# Patient Record
Sex: MALE | Race: WHITE | NOT HISPANIC OR LATINO | Employment: FULL TIME | URBAN - NONMETROPOLITAN AREA
[De-identification: names, ages, dates, MRNs, and addresses within clinical notes are randomized per-mention and may not be internally consistent; named-entity substitution may affect disease eponyms.]

---

## 2020-12-01 ENCOUNTER — APPOINTMENT (OUTPATIENT)
Dept: GENERAL RADIOLOGY | Facility: HOSPITAL | Age: 50
End: 2020-12-01

## 2020-12-01 ENCOUNTER — HOSPITAL ENCOUNTER (EMERGENCY)
Facility: HOSPITAL | Age: 50
Discharge: HOME | End: 2020-12-01
Attending: EMERGENCY MEDICINE | Admitting: STUDENT IN AN ORGANIZED HEALTH CARE EDUCATION/TRAINING PROGRAM

## 2020-12-01 VITALS
SYSTOLIC BLOOD PRESSURE: 151 MMHG | HEIGHT: 68 IN | TEMPERATURE: 98.3 F | DIASTOLIC BLOOD PRESSURE: 106 MMHG | OXYGEN SATURATION: 93 % | HEART RATE: 82 BPM | RESPIRATION RATE: 18 BRPM | BODY MASS INDEX: 35.61 KG/M2 | WEIGHT: 235 LBS

## 2020-12-01 DIAGNOSIS — M79.642 LEFT HAND PAIN: ICD-10-CM
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
## 2020-12-01 DIAGNOSIS — R07.81 RIB PAIN: ICD-10-CM

## 2020-12-01 PROCEDURE — 99283 EMERGENCY DEPT VISIT LOW MDM: CPT

## 2020-12-01 PROCEDURE — 71046 X-RAY EXAM CHEST 2 VIEWS: CPT

## 2020-12-01 PROCEDURE — 73130 X-RAY EXAM OF HAND: CPT

## 2020-12-01 RX ORDER — PRAVASTATIN SODIUM 20 MG
20 TABLET ORAL DAILY
COMMUNITY

## 2020-12-01 RX ORDER — LISINOPRIL 20 MG/1
20 TABLET ORAL DAILY
COMMUNITY
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
## 2022-07-19 LAB
ALBUMIN SERPL-MCNC: 4.5 G/DL (ref 3.8–4.9)
ALBUMIN/GLOB SERPL: 1.7 {RATIO} (ref 1.2–2.2)
ALP SERPL-CCNC: 84 IU/L (ref 44–121)
ALT SERPL-CCNC: 19 IU/L (ref 0–44)
AMBIG ABBREV LP DEFAULT: NORMAL
AST SERPL-CCNC: 20 IU/L (ref 0–40)
BASOPHILS # BLD AUTO: 0.1 X10E3/UL (ref 0–0.2)
BASOPHILS NFR BLD AUTO: 1 %
BILIRUB SERPL-MCNC: 0.6 MG/DL (ref 0–1.2)
BUN SERPL-MCNC: 16 MG/DL (ref 6–24)
BUN/CREAT SERPL: 17 (ref 9–20)
CALCIUM SERPL-MCNC: 9.4 MG/DL (ref 8.7–10.2)
CHLORIDE SERPL-SCNC: 102 MMOL/L (ref 96–106)
CHOLEST SERPL-MCNC: 192 MG/DL (ref 100–199)
CO2 SERPL-SCNC: 21 MMOL/L (ref 20–29)
CREAT SERPL-MCNC: 0.92 MG/DL (ref 0.76–1.27)
EOSINOPHIL # BLD AUTO: 0.3 X10E3/UL (ref 0–0.4)
EOSINOPHIL NFR BLD AUTO: 5 %
ERYTHROCYTE [DISTWIDTH] IN BLOOD BY AUTOMATED COUNT: 13 % (ref 11.6–15.4)
GLOBULIN SER CALC-MCNC: 2.6 G/DL (ref 1.5–4.5)
GLUCOSE SERPL-MCNC: 90 MG/DL (ref 65–99)
HCT VFR BLD AUTO: 45.5 % (ref 37.5–51)
HDLC SERPL-MCNC: 43 MG/DL
HGB BLD-MCNC: 16 G/DL (ref 13–17.7)
IMM GRANULOCYTES # BLD AUTO: 0 X10E3/UL (ref 0–0.1)
IMM GRANULOCYTES NFR BLD AUTO: 0 %
LDLC SERPL CALC-MCNC: 126 MG/DL (ref 0–99)
LYMPHOCYTES # BLD AUTO: 1.6 X10E3/UL (ref 0.7–3.1)
LYMPHOCYTES NFR BLD AUTO: 25 %
MCH RBC QN AUTO: 30.9 PG (ref 26.6–33)
MCHC RBC AUTO-ENTMCNC: 35.2 G/DL (ref 31.5–35.7)
MCV RBC AUTO: 88 FL (ref 79–97)
MONOCYTES # BLD AUTO: 0.5 X10E3/UL (ref 0.1–0.9)
MONOCYTES NFR BLD AUTO: 7 %
NEUTROPHILS # BLD AUTO: 4.1 X10E3/UL (ref 1.4–7)
NEUTROPHILS NFR BLD AUTO: 62 %
PLATELET # BLD AUTO: 297 X10E3/UL (ref 150–450)
POTASSIUM SERPL-SCNC: 4.4 MMOL/L (ref 3.5–5.2)
PROT SERPL-MCNC: 7.1 G/DL (ref 6–8.5)
RBC # BLD AUTO: 5.18 X10E6/UL (ref 4.14–5.8)
SODIUM SERPL-SCNC: 137 MMOL/L (ref 134–144)
TRIGL SERPL-MCNC: 129 MG/DL (ref 0–149)
VLDLC SERPL CALC-MCNC: 23 MG/DL (ref 5–40)
WBC # BLD AUTO: 6.6 X10E3/UL (ref 3.4–10.8)

## 2023-04-04 ENCOUNTER — OFFICE VISIT (OUTPATIENT)
Dept: CARDIOLOGY | Facility: CLINIC | Age: 53
End: 2023-04-04
Payer: COMMERCIAL

## 2023-04-04 VITALS
OXYGEN SATURATION: 93 % | DIASTOLIC BLOOD PRESSURE: 72 MMHG | HEART RATE: 79 BPM | BODY MASS INDEX: 37.59 KG/M2 | SYSTOLIC BLOOD PRESSURE: 126 MMHG | WEIGHT: 248 LBS | HEIGHT: 68 IN

## 2023-04-04 DIAGNOSIS — I10 HYPERTENSION, UNSPECIFIED TYPE: ICD-10-CM

## 2023-04-04 DIAGNOSIS — G47.33 OBSTRUCTIVE SLEEP APNEA (ADULT) (PEDIATRIC): Primary | ICD-10-CM

## 2023-04-04 DIAGNOSIS — E78.5 HYPERLIPIDEMIA, UNSPECIFIED HYPERLIPIDEMIA TYPE: ICD-10-CM

## 2023-04-04 PROCEDURE — 99214 OFFICE O/P EST MOD 30 MIN: CPT | Performed by: NURSE PRACTITIONER

## 2023-04-04 NOTE — PROGRESS NOTES
Follow-Up Sleep Consult     Date:   2023  Name: Prakash Hinton  :   1970  PCP: Provider, No Known    Chief Complaint   Patient presents with   • Sleep Apnea   • Hypertension       Subjective     History of Present Illness  Prakash Hniton is a 52 y.o. male who presents today for follow-up on THIAGO. HTN and HLD. Patient is doing well on PAP therapy with good control and compliance. AHI on download is 0.7. He is benefiting from PAP therapy, denies excessive daytime sleepiness and napping. Airflow is good, nasal pillow mask is comfortable with no significant leaking. Blood pressure is well controlled on Lisinopril. He denies chest pain, shortness of breath, palpitations, dizziness, lower extremity edema or syncope. Overall, patient is doing very well.     History of THIAGO with a baseline AHI of 36 on PSG 17.      Current Treatment: DreamStation to auto CPAP 8-12cm   Device Download from 3/5/2023 to 4/3/2023 reviewed and interpreted.  AHI on download is 0.7  Compliance on download is 90%   When used >4 hours is 87%  Average use per night is 5 hours   Current mask used is nasal pillow    The patient's relevant past medical, surgical, family, and social history reviewed and updated in Epic as appropriate.    Past Medical History:   Diagnosis Date   • Gallstones    • Hyperlipidemia    • Hypertension    • Obstructive sleep apnea (adult) (pediatric)     AHI 36     Past Surgical History:   Procedure Laterality Date   • DENTAL PROCEDURE      ROOT CANAL   • GALLBLADDER SURGERY  2022   • VASECTOMY         No Known Allergies  Prior to Admission medications    Medication Sig Start Date End Date Taking? Authorizing Provider   lisinopril (PRINIVIL,ZESTRIL) 20 MG tablet Take 20 mg by mouth Daily.    ProviderDameon MD   pravastatin (PRAVACHOL) 20 MG tablet Take 20 mg by mouth Daily.    Dameon Mari MD     History reviewed. No pertinent family history.    Objective     Vital Signs:  /72 (BP  "Location: Right arm, Patient Position: Sitting)   Pulse 79   Ht 172.7 cm (68\")   Wt 112 kg (248 lb)   SpO2 93%   BMI 37.71 kg/m²     Class 2 Severe Obesity (BMI >=35 and <=39.9). Obesity-related health conditions include the following: hypertension and dyslipidemias. Obesity is unchanged. BMI is is above average; BMI management plan is completed. We discussed portion control and increasing exercise.        Physical Exam  Vitals reviewed.   Constitutional:       Appearance: Normal appearance.   HENT:      Head: Normocephalic.   Cardiovascular:      Rate and Rhythm: Normal rate and regular rhythm.      Heart sounds: Normal heart sounds.   Pulmonary:      Effort: Pulmonary effort is normal.      Breath sounds: Normal breath sounds.   Skin:     General: Skin is warm and dry.      Capillary Refill: Capillary refill takes less than 2 seconds.   Neurological:      General: No focal deficit present.      Mental Status: He is alert and oriented to person, place, and time.   Psychiatric:         Mood and Affect: Mood normal.         Behavior: Behavior normal.             PAP download reviewed: 3/5/2023 to 4/3/2023 reviewed and interpreted.         Assessment and Plan     Diagnoses and all orders for this visit:    1. Obstructive sleep apnea (adult) (pediatric) (Primary)  Assessment & Plan:  Doing well on PAP therapy with good control and compliance. AHI 0.7 on download. He is benefiting from PAP therapy and we will continue therapy at current settings.     Orders:  -     PAP Therapy    2. Hyperlipidemia, unspecified hyperlipidemia type  Assessment & Plan:  Reassess with fasting lipid panel.  -Continue Pravastatin 20mg daily.     Orders:  -     Lipid Panel; Future  -     Comprehensive Metabolic Panel; Future  -     CBC & Differential; Future    3. Hypertension, unspecified type  Assessment & Plan:  Hypertension is well controlled on Lisinopril. Blood pressure today- 126/72  -Continue Lisinopril 20mg daily.     Orders:  -  "    Comprehensive Metabolic Panel; Future  -     CBC & Differential; Future      Limit salt, Exercise recommendations discussed and Report if any new/changing symptoms immediately         Follow Up  Return in about 1 year (around 4/4/2024).  Patient was given instructions and counseling regarding his condition or for health maintenance advice. Please see specific information pulled into the AVS if appropriate.

## 2023-04-04 NOTE — ASSESSMENT & PLAN NOTE
Doing well on PAP therapy with good control and compliance. AHI 0.7 on download. He is benefiting from PAP therapy and we will continue therapy at current settings.

## 2023-04-04 NOTE — ASSESSMENT & PLAN NOTE
Hypertension is well controlled on Lisinopril. Blood pressure today- 126/72  -Continue Lisinopril 20mg daily.

## 2023-08-21 DIAGNOSIS — I10 HYPERTENSION, UNSPECIFIED TYPE: ICD-10-CM

## 2023-08-21 DIAGNOSIS — E78.5 HYPERLIPIDEMIA, UNSPECIFIED HYPERLIPIDEMIA TYPE: ICD-10-CM

## 2023-10-27 RX ORDER — PRAVASTATIN SODIUM 40 MG
40 TABLET ORAL DAILY
Qty: 90 TABLET | Refills: 1 | Status: SHIPPED | OUTPATIENT
Start: 2023-10-27

## 2023-10-27 RX ORDER — LISINOPRIL 20 MG/1
20 TABLET ORAL DAILY
Qty: 90 TABLET | Refills: 1 | Status: SHIPPED | OUTPATIENT
Start: 2023-10-27

## 2024-04-08 ENCOUNTER — OFFICE VISIT (OUTPATIENT)
Dept: CARDIOLOGY | Facility: CLINIC | Age: 54
End: 2024-04-08
Payer: COMMERCIAL

## 2024-04-08 VITALS
WEIGHT: 255 LBS | DIASTOLIC BLOOD PRESSURE: 70 MMHG | HEIGHT: 68 IN | OXYGEN SATURATION: 94 % | SYSTOLIC BLOOD PRESSURE: 132 MMHG | BODY MASS INDEX: 38.65 KG/M2 | HEART RATE: 86 BPM

## 2024-04-08 DIAGNOSIS — E78.00 PURE HYPERCHOLESTEROLEMIA: ICD-10-CM

## 2024-04-08 DIAGNOSIS — R94.31 ABNORMAL EKG: ICD-10-CM

## 2024-04-08 DIAGNOSIS — I51.7 LEFT VENTRICULAR HYPERTROPHY: ICD-10-CM

## 2024-04-08 DIAGNOSIS — I10 PRIMARY HYPERTENSION: ICD-10-CM

## 2024-04-08 DIAGNOSIS — Z12.5 SCREENING PSA (PROSTATE SPECIFIC ANTIGEN): ICD-10-CM

## 2024-04-08 DIAGNOSIS — G47.33 OBSTRUCTIVE SLEEP APNEA (ADULT) (PEDIATRIC): Primary | ICD-10-CM

## 2024-04-08 PROCEDURE — 99214 OFFICE O/P EST MOD 30 MIN: CPT | Performed by: NURSE PRACTITIONER

## 2024-04-08 PROCEDURE — 93000 ELECTROCARDIOGRAM COMPLETE: CPT | Performed by: NURSE PRACTITIONER

## 2024-04-08 RX ORDER — CETIRIZINE HYDROCHLORIDE 10 MG/1
10 TABLET ORAL DAILY
COMMUNITY

## 2024-04-08 RX ORDER — PRAVASTATIN SODIUM 40 MG
40 TABLET ORAL DAILY
Qty: 90 TABLET | Refills: 1 | Status: SHIPPED | OUTPATIENT
Start: 2024-04-08

## 2024-04-08 RX ORDER — LISINOPRIL 20 MG/1
20 TABLET ORAL DAILY
Qty: 90 TABLET | Refills: 1 | Status: SHIPPED | OUTPATIENT
Start: 2024-04-08

## 2024-04-08 NOTE — ASSESSMENT & PLAN NOTE
He is 53 years old.    He does not have a PCP.    He is in agreement for annual screening for prostate cancer.    Plan:    PSA screening

## 2024-04-08 NOTE — PROGRESS NOTES
[facility] Provider Note     Date:   2024  Name: Prakash Hinton  :   1970  PCP: Provider, No Known    Chief Complaint   Patient presents with    Sleep Apnea       Subjective     History of Present Illness  Prakash Hinton is a 53 y.o. male who presents today for annual follow-up for his known history of severe sleep apnea on CPAP therapy, hypertension on lisinopril and hyperlipidemia on rosuvastatin.    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  He averages about 7 miles per day and he must push and pull very heavy loads.  He reports that occasionally he will get winded with pushing or pulling these heavy loads.  He reports that he has bilateral calf cramps and sharp pains that are a constant ache at times.  He is related this pain to his increased dose of statin.  Last year his statin was increased from 20 to 40 mg.      He follows with a chiropractor who has been working on this pain and had suggested an over-the-counter co-Q10.  He reports he is taking over-the-counter co-Q10.  With a slight improvement.    He reports that he had a 1 pack/day x 30-year tobacco history.  He did quit smoking nearly 5 years ago.    He reports that he does walk, stand and drive long hours with his job.  He reports that he does not have the energy to exercise after work.  His wife Kimber has been encouraging him to play pickle ball but he has not done this due to his bilateral lower extremity pain.    History of THIAGO with a baseline AHI of 36 on PSG 17.      Current Treatment: DreamStation to auto CPAP 8-12cm       Coexisting; Hypertension and hyperlipidemia      Reports Denies   Chest Pain [] [x]   Shortness of Air [x] []   Palpitations [] [x]   Edema [] [x]   Dizziness [] [x]   Syncope [] [x]       Current mask used is nasal    Device Functioning Well: Yes-but noted that he has a DreamStation #2 and we discussed that there is a  "warning for overheating.  His original device is greater than 5 years old and he wished to have it replaced.  Mask Fit Comfortable: Yes  Air Flow Comfortable: Yes  DME Helpful for Supplies: Yes he has been using [email or web address] but discussed new device and he wishes to use a local DME.  Sleep is rested:   Yes        Device Download:               No Known Allergies    Current Outpatient Medications:     lisinopril (PRINIVIL,ZESTRIL) 20 MG tablet, Take 1 tablet by mouth Daily., Disp: 90 tablet, Rfl: 1    pravastatin (PRAVACHOL) 40 MG tablet, Take 1 tablet by mouth Daily., Disp: 90 tablet, Rfl: 1    cetirizine (zyrTEC) 10 MG tablet, Take 1 tablet by mouth Daily., Disp: , Rfl:     Past Medical History:   Diagnosis Date    Gallstones     Hyperlipidemia     Hypertension     Obstructive sleep apnea (adult) (pediatric)     AHI 36      Past Surgical History:   Procedure Laterality Date    DENTAL PROCEDURE      ROOT CANAL    GALLBLADDER SURGERY  12/26/2022    VASECTOMY       History reviewed. No pertinent family history.  Social History     Socioeconomic History    Marital status:     Number of children: 2   Tobacco Use    Smoking status: Former    Smokeless tobacco: Never   Vaping Use    Vaping status: Never Used   Substance and Sexual Activity    Alcohol use: Never    Drug use: Never       Objective     Vital Signs:  /70   Pulse 86   Ht 172.7 cm (68\")   Wt 116 kg (255 lb)   SpO2 94%   BMI 38.77 kg/m²   Estimated body mass index is 38.77 kg/m² as calculated from the following:    Height as of this encounter: 172.7 cm (68\").    Weight as of this encounter: 116 kg (255 lb).       Class 2 Severe Obesity (BMI >=35 and <=39.9). Obesity-related health conditions include the following: obstructive sleep apnea, hypertension, and dyslipidemias. Obesity is worsening. BMI is is above average; BMI management plan is completed. We discussed low calorie, low carb based diet program, portion control, and increasing " exercise.      Physical Exam  Constitutional:       Appearance: Normal appearance. He is well-developed. He is obese.   HENT:      Head: Normocephalic and atraumatic.      Nose: Nose normal.      Mouth/Throat:      Mouth: Mucous membranes are moist.   Eyes:      General: No scleral icterus.     Pupils: Pupils are equal, round, and reactive to light.   Neck:      Vascular: No carotid bruit.   Cardiovascular:      Rate and Rhythm: Normal rate and regular rhythm.      Pulses: Normal pulses.           Radial pulses are 2+ on the right side and 2+ on the left side.        Dorsalis pedis pulses are 2+ on the right side and 2+ on the left side.        Posterior tibial pulses are 2+ on the right side and 2+ on the left side.      Heart sounds: Normal heart sounds. No murmur heard.  Pulmonary:      Effort: Pulmonary effort is normal.      Breath sounds: Normal breath sounds. No wheezing or rhonchi.   Abdominal:      General: Bowel sounds are normal.   Musculoskeletal:      Cervical back: Neck supple.      Right lower leg: No edema.      Left lower leg: No edema.   Skin:     General: Skin is warm and dry.      Capillary Refill: Capillary refill takes less than 2 seconds.      Coloration: Skin is not cyanotic.      Nails: There is no clubbing.   Neurological:      Mental Status: He is alert and oriented to person, place, and time.      Motor: No weakness.      Gait: Gait normal.   Psychiatric:         Mood and Affect: Mood normal.         Behavior: Behavior normal. Behavior is cooperative.         Thought Content: Thought content normal.         Cognition and Memory: Memory normal.         The following data was reviewed by: MARIO Neff on 04/08/2024:    PAP download reviewed: 3/6/2024 through 4/4/2024.  30-day download.  I have reviewed and interpreted the data on the download.      ECG 12 Lead    Date/Time: 4/8/2024 3:34 PM  Performed by: Gabriela Toledo APRN    Authorized by: Gabriela Toledo APRN   Comparison: compared with previous ECG from 2/10/2017  Similar to previous ECG  Comparison to previous ECG: SR with IRBBB  Rhythm: sinus rhythm  Rate: normal  BPM: 82  Conduction: incomplete right bundle branch block and 1st degree AV block  Other findings: non-specific ST-T wave changes    Clinical impression: abnormal EKG  Comments: Cannot rule out, age undetermined Anterior infarct           Assessment and Plan     Diagnoses and all orders for this visit:    1. Obstructive sleep apnea (adult) (pediatric) (Primary)  Assessment & Plan:  Baseline AHI is 36.  This is severe sleep apnea.    He is on CPAP therapy.  Download reviewed with good control and good compliance.    He is benefiting from PAP therapy with plan to continue PAP therapy.    Noted that his PAP device is greater than 5 years old and he wishes to have this replaced.    Prescription to DME of patient's choice for a new auto CPAP 8 to 12 cm and CPAP supplies.    Follow-up on new CPAP when used about 2 months or for a 31 to 90-day visit for compliance and control and download review.    Orders:  -     PAP Therapy    2. Pure hypercholesterolemia  Assessment & Plan:   Since increasing his pravastatin to 40 mg he has had a problem with bilateral lower extremity muscle aches and sharp pains.    He did start a supplement of co-Q10 with slight improvement.    Most recent labs show LDL well-controlled at near 100 and a nondiabetic.    Plan:    He may have a trial of reducing his pravastatin to 20 mg daily.    Recheck annual fasting lipid profile    Orders:  -     Lipid Panel; Future  -     pravastatin (PRAVACHOL) 40 MG tablet; Take 1 tablet by mouth Daily.  Dispense: 90 tablet; Refill: 1    3. Primary hypertension  Assessment & Plan:  Blood pressure today 132/70.  This is well-controlled.    He is on lisinopril 20 mg daily.    Plan:    Continue lisinopril 20 mg daily    Check CBC and CMP    Orders:  -     CBC & Differential; Future  -     Comprehensive  Metabolic Panel; Future  -     lisinopril (PRINIVIL,ZESTRIL) 20 MG tablet; Take 1 tablet by mouth Daily.  Dispense: 90 tablet; Refill: 1  -     ECG 12 Lead  -     Adult Transthoracic Echo Complete W/ Cont if Necessary Per Protocol; Future  -     Adult Stress Echo W/ Cont or Stress Agent if Necessary Per Protocol; Future    4. Screening PSA (prostate specific antigen)  Assessment & Plan:  He is 53 years old.    He does not have a PCP.    He is in agreement for annual screening for prostate cancer.    Plan:    PSA screening    Orders:  -     PSA Screen; Future    5. Abnormal EKG  Assessment & Plan:  EKG today with sinus rhythm with first-degree AV block, incomplete right bundle branch block, moderate voltage criteria for LVH and anterior lateral infarct age undetermined.    Plan:    Check echo for voltage criteria for LVH    Plan restratification with stress testing for anterior lateral infarct age undetermined    Orders:  -     Adult Transthoracic Echo Complete W/ Cont if Necessary Per Protocol; Future  -     Adult Stress Echo W/ Cont or Stress Agent if Necessary Per Protocol; Future    6. Left ventricular hypertrophy  Assessment & Plan:  EKG with moderate voltage criteria for LVH.    Plan echo    Orders:  -     Adult Transthoracic Echo Complete W/ Cont if Necessary Per Protocol; Future        Recommendations: Report if any new/changing symptoms immediately          Follow Up  Return in about 3 months (around 6/27/2024) for New Cpap and Lab results in 31-90 days .  Patient was given instructions and counseling regarding his condition or for health maintenance advice. Please see specific information pulled into the AVS if appropriate.

## 2024-04-08 NOTE — ASSESSMENT & PLAN NOTE
Since increasing his pravastatin to 40 mg he has had a problem with bilateral lower extremity muscle aches and sharp pains.    He did start a supplement of co-Q10 with slight improvement.    Most recent labs show LDL well-controlled at near 100 and a nondiabetic.    Plan:    He may have a trial of reducing his pravastatin to 20 mg daily.    Recheck annual fasting lipid profile

## 2024-04-08 NOTE — ASSESSMENT & PLAN NOTE
EKG today with sinus rhythm with first-degree AV block, incomplete right bundle branch block, moderate voltage criteria for LVH and anterior lateral infarct age undetermined.    Plan:    Check echo for voltage criteria for LVH    Plan restratification with stress testing for anterior lateral infarct age undetermined

## 2024-04-08 NOTE — ASSESSMENT & PLAN NOTE
Baseline AHI is 36.  This is severe sleep apnea.    He is on CPAP therapy.  Download reviewed with good control and good compliance.    He is benefiting from PAP therapy with plan to continue PAP therapy.    Noted that his PAP device is greater than 5 years old and he wishes to have this replaced.    Prescription to DME of patient's choice for a new auto CPAP 8 to 12 cm and CPAP supplies.    Follow-up on new CPAP when used about 2 months or for a 31 to 90-day visit for compliance and control and download review.

## 2024-04-08 NOTE — ASSESSMENT & PLAN NOTE
Blood pressure today 132/70.  This is well-controlled.    He is on lisinopril 20 mg daily.    Plan:    Continue lisinopril 20 mg daily    Check CBC and CMP

## 2024-04-18 DIAGNOSIS — E78.00 PURE HYPERCHOLESTEROLEMIA: ICD-10-CM

## 2024-04-18 DIAGNOSIS — Z12.5 SCREENING PSA (PROSTATE SPECIFIC ANTIGEN): ICD-10-CM

## 2024-04-18 DIAGNOSIS — I10 PRIMARY HYPERTENSION: ICD-10-CM

## 2024-04-26 ENCOUNTER — OFFICE VISIT (OUTPATIENT)
Dept: CARDIOLOGY | Facility: CLINIC | Age: 54
End: 2024-04-26
Payer: COMMERCIAL

## 2024-04-26 VITALS
SYSTOLIC BLOOD PRESSURE: 136 MMHG | OXYGEN SATURATION: 94 % | WEIGHT: 253 LBS | HEIGHT: 68 IN | BODY MASS INDEX: 38.34 KG/M2 | DIASTOLIC BLOOD PRESSURE: 78 MMHG | HEART RATE: 78 BPM

## 2024-04-26 DIAGNOSIS — R94.31 ABNORMAL EKG: Primary | ICD-10-CM

## 2024-04-26 DIAGNOSIS — E78.00 PURE HYPERCHOLESTEROLEMIA: ICD-10-CM

## 2024-04-26 DIAGNOSIS — I10 PRIMARY HYPERTENSION: ICD-10-CM

## 2024-04-26 PROCEDURE — 99214 OFFICE O/P EST MOD 30 MIN: CPT | Performed by: INTERNAL MEDICINE

## 2024-04-26 NOTE — PROGRESS NOTES
[facility] Provider Note     Date:   2024   Name: Prakash Hinton  :   1970  PCP: Provider, No Known    Chief Complaint   Patient presents with    Follow-up     Patient here for stress/echo- target HR reached/ only symptom some shortness of breath       Subjective     History of Present Illness  Prakash Hinton is a 53 y.o. male who presents today for follow-up on some shortness of breath and cardiac risk factors.  He reports no chest pain.  He is doing well otherwise.  He has not had any lower extremity edema.  He has had some bilateral calf cramping.  It happens when he sits down in his truck and certain positions.  It also gets worse with walking.  He does not have any toe discoloration or ulcers on his legs.  He has great peripheral pulses.  He underwent stress testing today and the results were overall low risk.  He is overall feeling well.  We discussed his cholesterol and lab work as well.    History of THIAGO with a baseline AHI of 36 on PSG 17.      Current Treatment: [employer] to auto CPAP 8-12cm       Coexisting; Hypertension and hyperlipidemia     No Known Allergies    Current Outpatient Medications:     cetirizine (zyrTEC) 10 MG tablet, Take 1 tablet by mouth Daily., Disp: , Rfl:     lisinopril (PRINIVIL,ZESTRIL) 20 MG tablet, Take 1 tablet by mouth Daily., Disp: 90 tablet, Rfl: 1    pravastatin (PRAVACHOL) 40 MG tablet, Take 1 tablet by mouth Daily., Disp: 90 tablet, Rfl: 1    Past Medical History:   Diagnosis Date    Gallstones     Hyperlipidemia     Hypertension     Obstructive sleep apnea (adult) (pediatric)     AHI 36      Past Surgical History:   Procedure Laterality Date    DENTAL PROCEDURE      ROOT CANAL    GALLBLADDER SURGERY  2022    VASECTOMY       History reviewed. No pertinent family history.  Social History     Socioeconomic History    Marital status:     Number of children: 2   Tobacco Use    Smoking status: Former    Smokeless  "tobacco: Never   Vaping Use    Vaping status: Never Used   Substance and Sexual Activity    Alcohol use: Never    Drug use: Never       Objective     Vital Signs:  /78 (BP Location: Right arm, Patient Position: Sitting, Cuff Size: Adult)   Pulse 78   Ht 172.7 cm (68\")   Wt 115 kg (253 lb)   SpO2 94%   BMI 38.47 kg/m²   Estimated body mass index is 38.47 kg/m² as calculated from the following:    Height as of this encounter: 172.7 cm (68\").    Weight as of this encounter: 115 kg (253 lb).         Physical Exam  Vitals reviewed.   Constitutional:       General: He is not in acute distress.     Appearance: Normal appearance.   HENT:      Head: Normocephalic and atraumatic.      Mouth/Throat:      Mouth: Mucous membranes are moist.   Eyes:      Conjunctiva/sclera: Conjunctivae normal.   Neck:      Vascular: No carotid bruit.   Cardiovascular:      Rate and Rhythm: Normal rate and regular rhythm.      Pulses: Normal pulses.      Heart sounds: Normal heart sounds. No murmur heard.  Pulmonary:      Effort: Pulmonary effort is normal. No respiratory distress.      Breath sounds: Normal breath sounds. No wheezing or rhonchi.   Abdominal:      General: Abdomen is flat.      Palpations: Abdomen is soft.   Musculoskeletal:      Cervical back: Normal range of motion and neck supple.      Right lower leg: No edema.      Left lower leg: No edema.   Skin:     General: Skin is warm and dry.      Coloration: Skin is not jaundiced.   Neurological:      General: No focal deficit present.      Mental Status: He is alert and oriented to person, place, and time. Mental status is at baseline.      GCS: GCS eye subscore is 4. GCS verbal subscore is 5. GCS motor subscore is 6.      Cranial Nerves: No cranial nerve deficit.      Motor: No weakness.      Gait: Gait normal.   Psychiatric:         Mood and Affect: Mood and affect normal. Mood is not anxious.         Speech: Speech normal.         Behavior: Behavior normal. "                     Assessment and Plan     Diagnoses and all orders for this visit:    1. Abnormal EKG (Primary)  Comments:  Low risk stress test.    2. Primary hypertension  Comments:  Controlled.  Continue current medication.    3. Pure hypercholesterolemia  Comments:  Moderately elevated but having myalgias with statins.  Trying over the counter Krill oil.  Will need to recheck labs in 6 months.        Recommendations: Report if any new/changing symptoms immediately      Follow Up  Return in about 6 months (around 10/26/2024) for Recheck symptoms, Patient OK with NP visit.    Avril Blanco MD   04/26/2024     Please note that this explicitly excludes time spent on other separate billable services such as performing procedures or test interpretation, when applicable.    This note was created using dictation software which occasionally transcribes nonsensical phrases. Please contact the provider if any clarification is needed.

## 2024-06-11 ENCOUNTER — OFFICE VISIT (OUTPATIENT)
Dept: CARDIOLOGY | Facility: CLINIC | Age: 54
End: 2024-06-11
Payer: COMMERCIAL

## 2024-06-11 VITALS
HEART RATE: 86 BPM | WEIGHT: 249 LBS | BODY MASS INDEX: 37.74 KG/M2 | DIASTOLIC BLOOD PRESSURE: 74 MMHG | HEIGHT: 68 IN | OXYGEN SATURATION: 93 % | SYSTOLIC BLOOD PRESSURE: 126 MMHG

## 2024-06-11 DIAGNOSIS — I10 PRIMARY HYPERTENSION: ICD-10-CM

## 2024-06-11 DIAGNOSIS — G47.33 OBSTRUCTIVE SLEEP APNEA (ADULT) (PEDIATRIC): Primary | ICD-10-CM

## 2024-06-11 DIAGNOSIS — E78.00 PURE HYPERCHOLESTEROLEMIA: ICD-10-CM

## 2024-06-11 PROCEDURE — 99214 OFFICE O/P EST MOD 30 MIN: CPT | Performed by: NURSE PRACTITIONER

## 2024-06-11 RX ORDER — EZETIMIBE 10 MG/1
10 TABLET ORAL DAILY
Qty: 90 TABLET | Refills: 0 | Status: SHIPPED | OUTPATIENT
Start: 2024-06-11

## 2024-06-11 RX ORDER — PRAVASTATIN SODIUM 20 MG
20 TABLET ORAL DAILY
Qty: 90 TABLET | Refills: 1 | Status: SHIPPED | OUTPATIENT
Start: 2024-06-11

## 2024-06-11 NOTE — PROGRESS NOTES
[facility] Provider Note     Date:   2024  Name: Prakash Hinton  :   1970  PCP: Provider, No Known    Chief Complaint   Patient presents with    Sleep Apnea       Subjective     History of Present Illness  Prakash Hinton is a 53 y.o. male who presents today for 3-month follow-up THIAGO on new CPAP and after having a low risk stress test.    He reports today that he can perform his physical activity at work and he is not having shortness of air or chest pain.    He will rarely have bilateral ankle edema after eating Chinese food.    He reports that his legs have muscle aches and pains associated with his statin.  He reports if he does not take the statin he does not have leg pain.  Most recent labs indicate cholesterol is not well-controlled.    He wonders if there is other medications for his cholesterol level.    Sleep and Cardiac History :     1. THIAGO baseline AHI of 36 on PSG 17     -Current THIAGO therapy auto CPAP 8-12cm     2. ECHO 24 :  ·  Left ventricular systolic function is normal. Left ventricular ejection fraction appears to be 61 - 65%.  ·  Left ventricular wall thickness is consistent with borderline concentric hypertrophy.  ·  Left ventricular diastolic function was normal.  ·  Mild dilation of the sinuses of Valsalva is present. Mild dilation of the ascending aorta is present.     3. Stress ECHO 2024- Low Risk       Coexisting; Hypertension and hyperlipidemia      Reports Denies   Chest Pain [] [x]   Shortness of Air [] [x]   Palpitations [] [x]   Edema [] [x]   Dizziness [] [x]   Syncope [] [x]       Current mask used is Nasal pillow     Device Functioning Well: Yes-he has a new CPAP and he is satisfied with the function.  Mask Fit Comfortable: Yes  Air Flow Comfortable: Yes  DME Helpful for Supplies: Yes  Sleep is rested:   Yes        Device Download:                 No Known Allergies    Current Outpatient Medications:     cetirizine (zyrTEC) 10 MG  "tablet, Take 1 tablet by mouth Daily., Disp: , Rfl:     lisinopril (PRINIVIL,ZESTRIL) 20 MG tablet, Take 1 tablet by mouth Daily., Disp: 90 tablet, Rfl: 1    ezetimibe (ZETIA) 10 MG tablet, Take 1 tablet by mouth Daily., Disp: 90 tablet, Rfl: 0    pravastatin (Pravachol) 20 MG tablet, Take 1 tablet by mouth Daily., Disp: 90 tablet, Rfl: 1    Past Medical History:   Diagnosis Date    Gallstones     Hyperlipidemia     Hypertension     Obstructive sleep apnea (adult) (pediatric)     AHI 36      Past Surgical History:   Procedure Laterality Date    DENTAL PROCEDURE      ROOT CANAL    GALLBLADDER SURGERY  2022    VASECTOMY       History reviewed. No pertinent family history.  Social History     Socioeconomic History    Marital status:     Number of children: 2   Tobacco Use    Smoking status: Former     Current packs/day: 0.00     Types: Cigarettes     Quit date:      Years since quittin.4     Passive exposure: Past    Smokeless tobacco: Never   Vaping Use    Vaping status: Never Used   Substance and Sexual Activity    Alcohol use: Never    Drug use: Never       Objective     Vital Signs:  /74 (BP Location: Right arm, Patient Position: Sitting)   Pulse 86   Ht 172.7 cm (68\")   Wt 113 kg (249 lb)   SpO2 93%   BMI 37.86 kg/m²   Estimated body mass index is 37.86 kg/m² as calculated from the following:    Height as of this encounter: 172.7 cm (68\").    Weight as of this encounter: 113 kg (249 lb).               Physical Exam  HENT:      Head: Normocephalic.      Nose: Nose normal.      Mouth/Throat:      Mouth: Mucous membranes are moist.   Pulmonary:      Effort: Pulmonary effort is normal.   Skin:     General: Skin is warm and dry.   Neurological:      Mental Status: He is alert and oriented to person, place, and time.   Psychiatric:         Mood and Affect: Mood normal.         Behavior: Behavior normal.         Thought Content: Thought content normal.         The following data was " reviewed by: MARIO Neff on 06/11/2024:    PAP download reviewed: 30-day download as above.  I have reviewed and interpreted the data on the download at today's visit.          Assessment and Plan     Diagnoses and all orders for this visit:    1. Obstructive sleep apnea (adult) (pediatric) (Primary)  Assessment & Plan:  Baseline AHI is 36.  This is severe sleep apnea.    He has a new CPAP more than 1 month but less than 3 months.  He reports he is satisfied with the new device.    He is on CPAP therapy.  Download reviewed with good control and good compliance.    He is benefiting from PAP therapy with plan to continue PAP therapy.      2. Pure hypercholesterolemia  Assessment & Plan:   Since increasing his statin to 40 mg he has had problems with bilateral lower extremity muscle aches and sharp pains.    He did try adding a supplement of co-Q10 as well as Krill and neither of these have improved the sharp pains.    He is currently taking his statin every other day.    Last labs show .    Plan:    Decrease the pravastatin to 20 mg daily x 2 weeks and see if symptoms improve.    And Zetia 10 mg daily    Recheck labs in 3 months.        Orders:  -     pravastatin (Pravachol) 20 MG tablet; Take 1 tablet by mouth Daily.  Dispense: 90 tablet; Refill: 1  -     ezetimibe (ZETIA) 10 MG tablet; Take 1 tablet by mouth Daily.  Dispense: 90 tablet; Refill: 0  -     Lipid Panel; Future  -     Comprehensive Metabolic Panel; Future    3. Primary hypertension  Assessment & Plan:  Blood pressure 126/74.  Blood pressure is well-controlled.    Is currently on lisinopril 20 mg daily.    Plan:    Continue lisinopril 20 mg daily          Recommendations: Report if any new/changing symptoms immediately          Follow Up  Return in about 3 months (around 9/11/2024) for Lab results/CHol/BP .  Patient was given instructions and counseling regarding his condition or for health maintenance advice. Please see specific  information pulled into the AVS if appropriate.

## 2024-06-11 NOTE — ASSESSMENT & PLAN NOTE
Blood pressure 126/74.  Blood pressure is well-controlled.    Is currently on lisinopril 20 mg daily.    Plan:    Continue lisinopril 20 mg daily

## 2024-06-11 NOTE — ASSESSMENT & PLAN NOTE
Baseline AHI is 36.  This is severe sleep apnea.    He has a new CPAP more than 1 month but less than 3 months.  He reports he is satisfied with the new device.    He is on CPAP therapy.  Download reviewed with good control and good compliance.    He is benefiting from PAP therapy with plan to continue PAP therapy.

## 2024-06-11 NOTE — ASSESSMENT & PLAN NOTE
Since increasing his statin to 40 mg he has had problems with bilateral lower extremity muscle aches and sharp pains.    He did try adding a supplement of co-Q10 as well as Krill and neither of these have improved the sharp pains.    He is currently taking his statin every other day.    Last labs show .    Plan:    Decrease the pravastatin to 20 mg daily x 2 weeks and see if symptoms improve.    And Zetia 10 mg daily    Recheck labs in 3 months.

## 2024-09-09 DIAGNOSIS — E78.00 PURE HYPERCHOLESTEROLEMIA: ICD-10-CM

## 2024-09-09 RX ORDER — EZETIMIBE 10 MG/1
10 TABLET ORAL DAILY
Qty: 90 TABLET | Refills: 0 | Status: SHIPPED | OUTPATIENT
Start: 2024-09-09 | End: 2024-09-10 | Stop reason: SDUPTHER

## 2024-09-10 ENCOUNTER — OFFICE VISIT (OUTPATIENT)
Dept: CARDIOLOGY | Facility: CLINIC | Age: 54
End: 2024-09-10
Payer: COMMERCIAL

## 2024-09-10 VITALS
OXYGEN SATURATION: 93 % | BODY MASS INDEX: 35.61 KG/M2 | DIASTOLIC BLOOD PRESSURE: 86 MMHG | HEART RATE: 85 BPM | WEIGHT: 235 LBS | HEIGHT: 68 IN | SYSTOLIC BLOOD PRESSURE: 118 MMHG

## 2024-09-10 DIAGNOSIS — G47.33 OBSTRUCTIVE SLEEP APNEA (ADULT) (PEDIATRIC): ICD-10-CM

## 2024-09-10 DIAGNOSIS — I10 PRIMARY HYPERTENSION: ICD-10-CM

## 2024-09-10 DIAGNOSIS — E78.00 PURE HYPERCHOLESTEROLEMIA: ICD-10-CM

## 2024-09-10 DIAGNOSIS — E78.00 PURE HYPERCHOLESTEROLEMIA: Primary | ICD-10-CM

## 2024-09-10 PROCEDURE — 99214 OFFICE O/P EST MOD 30 MIN: CPT | Performed by: NURSE PRACTITIONER

## 2024-09-10 RX ORDER — EZETIMIBE 10 MG/1
10 TABLET ORAL DAILY
Qty: 90 TABLET | Refills: 1 | Status: SHIPPED | OUTPATIENT
Start: 2024-09-10

## 2024-09-10 RX ORDER — PRAVASTATIN SODIUM 20 MG
20 TABLET ORAL DAILY
Qty: 90 TABLET | Refills: 1 | Status: SHIPPED | OUTPATIENT
Start: 2024-09-10

## 2024-09-10 RX ORDER — LISINOPRIL 20 MG/1
20 TABLET ORAL DAILY
Qty: 90 TABLET | Refills: 1 | Status: SHIPPED | OUTPATIENT
Start: 2024-09-10

## 2024-09-10 NOTE — ASSESSMENT & PLAN NOTE
Baseline AHI is 36.  This is severe sleep apnea.    He is on CPAP therapy.  Download reviewed with good control and good compliance.    He is benefiting from PAP therapy.  We plan to continue PAP therapy.    He has a current DME order for his supplies.

## 2024-09-10 NOTE — PROGRESS NOTES
[facility] Provider Note     Date:   09/10/2024  Name: Prakash Hinton  :   1970  PCP: Provider, No Known    Chief Complaint   Patient presents with    Sleep Apnea       Subjective     History of Present Illness  Prakash Hinton is a 54 y.o. male who presents today for scheduled 3-month follow-up to repeat his labs.  He reports he did complete his labs prior to this visit.    He reports that he is taking both the statin and the Zetia.  He denies any leg pain, muscle aches that he associates with statin therapy.  He reports that he is a  and occasionally he will have muscle aches and strains due to his job.    Sleep and Cardiac History :     1. THIAGO baseline AHI of 36 on PSG 17     -Current THIAGO therapy auto CPAP 8-12cm     2. ECHO 24 :  ·  Left ventricular systolic function is normal. Left ventricular ejection fraction appears to be 61 - 65%.  ·  Left ventricular wall thickness is consistent with borderline concentric hypertrophy.  ·  Left ventricular diastolic function was normal.  ·  Mild dilation of the sinuses of Valsalva is present. Mild dilation of the ascending aorta is present.     3. Stress ECHO 2024- Low Risk       Coexisting; Hypertension and hyperlipidemia      Reports Denies   Chest Pain [] [x]   Shortness of Air [] [x]   Palpitations [] [x]   Edema [] [x]   Dizziness [] [x]   Syncope [] [x]     Current mask used is nasal cushion    Device Functioning Well: Yes  Mask Fit Comfortable: Yes  Air Flow Comfortable: Yes  DME Helpful for Supplies: Yes  Sleep is rested:   Yes    Device Download:               No Known Allergies    Current Outpatient Medications:     cetirizine (zyrTEC) 10 MG tablet, Take 1 tablet by mouth Daily., Disp: , Rfl:     ezetimibe (ZETIA) 10 MG tablet, Take 1 tablet by mouth Daily., Disp: 90 tablet, Rfl: 1    lisinopril (PRINIVIL,ZESTRIL) 20 MG tablet, Take 1 tablet by mouth Daily., Disp: 90 tablet, Rfl: 1    pravastatin  "(Pravachol) 20 MG tablet, Take 1 tablet by mouth Daily., Disp: 90 tablet, Rfl: 1    Past Medical History:   Diagnosis Date    Gallstones     Hyperlipidemia     Hypertension     Obstructive sleep apnea (adult) (pediatric)     AHI 36      Past Surgical History:   Procedure Laterality Date    DENTAL PROCEDURE      ROOT CANAL    GALLBLADDER SURGERY  2022    VASECTOMY       History reviewed. No pertinent family history.  Social History     Socioeconomic History    Marital status:     Number of children: 2   Tobacco Use    Smoking status: Former     Current packs/day: 0.00     Types: Cigarettes     Quit date:      Years since quittin.6     Passive exposure: Past    Smokeless tobacco: Never   Vaping Use    Vaping status: Never Used   Substance and Sexual Activity    Alcohol use: Never    Drug use: Never       Objective     Vital Signs:  /86   Pulse 85   Ht 172.7 cm (68\")   Wt 107 kg (235 lb)   SpO2 93%   BMI 35.73 kg/m²   Estimated body mass index is 35.73 kg/m² as calculated from the following:    Height as of this encounter: 172.7 cm (68\").    Weight as of this encounter: 107 kg (235 lb).               Physical Exam  HENT:      Head: Normocephalic.      Nose: Nose normal.      Mouth/Throat:      Mouth: Mucous membranes are moist.   Pulmonary:      Effort: Pulmonary effort is normal.   Skin:     General: Skin is warm and dry.   Neurological:      Mental Status: He is alert and oriented to person, place, and time.   Psychiatric:         Mood and Affect: Mood normal.         Behavior: Behavior normal.         Thought Content: Thought content normal.         The following data was reviewed by: MARIO Neff on 09/10/2024:    Labs reviewed: 2024 CMP and lipid and PAP download reviewed: 30-day download as above.  I have reviewed and interpreted the data on the download at today's visit          Assessment and Plan     Diagnoses and all orders for this visit:    1. Pure " hypercholesterolemia (Primary)  Assessment & Plan:   He is currently on over-the-counter supplement for CoQ 10 and krill oil.    He is taking prescriptive pravastatin 20 mg daily and Zetia 10 mg daily.    Noted with increased dose of pravastatin to 40 mg that he experienced bilateral lower extremity muscle aches and pains.    He has rechecked his lipid level and a CMP and these are both reviewed today.    Labs show cholesterol levels are much improved.  LDL at goal.  HDL is low.    Plan:    Continue pravastatin 20 mg daily and Zetia 10 mg daily    Encouraged omega-3 in his diet and exercise to increase HDL    Orders:  -     ezetimibe (ZETIA) 10 MG tablet; Take 1 tablet by mouth Daily.  Dispense: 90 tablet; Refill: 1  -     pravastatin (Pravachol) 20 MG tablet; Take 1 tablet by mouth Daily.  Dispense: 90 tablet; Refill: 1    2. Primary hypertension  Assessment & Plan:  Blood pressure today is 118/86.  This is well-controlled.    He is currently on lisinopril 20 mg daily.    Plan:    Continue lisinopril 20 mg daily    Orders:  -     lisinopril (PRINIVIL,ZESTRIL) 20 MG tablet; Take 1 tablet by mouth Daily.  Dispense: 90 tablet; Refill: 1    3. Obstructive sleep apnea (adult) (pediatric)  Assessment & Plan:  Baseline AHI is 36.  This is severe sleep apnea.    He is on CPAP therapy.  Download reviewed with good control and good compliance.    He is benefiting from PAP therapy.  We plan to continue PAP therapy.    He has a current DME order for his supplies.          Recommendations: Report if any new/changing symptoms immediately          Follow Up  Return in about 6 months (around 3/10/2025) for HLD/HTN/THIAGO.  Patient was given instructions and counseling regarding his condition or for health maintenance advice. Please see specific information pulled into the AVS if appropriate.

## 2024-09-10 NOTE — ASSESSMENT & PLAN NOTE
He is currently on over-the-counter supplement for CoQ 10 and krill oil.    He is taking prescriptive pravastatin 20 mg daily and Zetia 10 mg daily.    Noted with increased dose of pravastatin to 40 mg that he experienced bilateral lower extremity muscle aches and pains.    He has rechecked his lipid level and a CMP and these are both reviewed today.    Labs show cholesterol levels are much improved.  LDL at goal.  HDL is low.    Plan:    Continue pravastatin 20 mg daily and Zetia 10 mg daily    Encouraged omega-3 in his diet and exercise to increase HDL

## 2024-09-10 NOTE — ASSESSMENT & PLAN NOTE
Blood pressure today is 118/86.  This is well-controlled.    He is currently on lisinopril 20 mg daily.    Plan:    Continue lisinopril 20 mg daily

## 2024-10-30 ENCOUNTER — OFFICE VISIT (OUTPATIENT)
Dept: CARDIOLOGY | Facility: CLINIC | Age: 54
End: 2024-10-30
Payer: COMMERCIAL

## 2024-10-30 VITALS
SYSTOLIC BLOOD PRESSURE: 128 MMHG | DIASTOLIC BLOOD PRESSURE: 84 MMHG | BODY MASS INDEX: 35.61 KG/M2 | HEIGHT: 68 IN | HEART RATE: 73 BPM | WEIGHT: 235 LBS | OXYGEN SATURATION: 95 %

## 2024-10-30 DIAGNOSIS — I77.810 DILATED AORTIC ROOT: ICD-10-CM

## 2024-10-30 DIAGNOSIS — R91.1 NODULE OF MIDDLE LOBE OF RIGHT LUNG: Primary | ICD-10-CM

## 2024-10-30 DIAGNOSIS — I10 PRIMARY HYPERTENSION: ICD-10-CM

## 2024-10-30 DIAGNOSIS — E78.00 PURE HYPERCHOLESTEROLEMIA: ICD-10-CM

## 2024-10-30 PROCEDURE — 99214 OFFICE O/P EST MOD 30 MIN: CPT | Performed by: NURSE PRACTITIONER

## 2024-10-30 NOTE — ASSESSMENT & PLAN NOTE
3mm nodule in the right middle lobe seen on CT 2022. He has not had any follow up CT of the chest.     No cough, no unexplained weight loss.     He has tobacco history of  33 years with 1 ppd. He quiet 2017.     Plan:     CT of the chest with contrast for follow up.     He wishes for me to call his with results     He has scheduled follow up in about 5 months

## 2024-10-30 NOTE — ASSESSMENT & PLAN NOTE
Seen on echo:  -Dilated ascending aorta  -Dilated sinus of Valsalva    Plan:    CT of the chest for further evaluation to rule out aneurysm

## 2024-10-30 NOTE — PROGRESS NOTES
[facility] Provider Note     Date:   10/30/2024  Name: Prakash Hinton  :   1970  PCP: Provider, No Known    Chief Complaint   Patient presents with    Hyperlipidemia       Subjective     History of Present Illness  Prakash Hinton is a 54 y.o. male who presents today for short follow-up on his diagnosis of hyperlipidemia and having complaints of right lower extremity leg pain on statin therapy.    He reports today that he does have right calf and right knee pain from time to time with the statin.  Overall he feels like he can tolerate statin therapy and plans to continue his medications.    At today's visit we reviewed his last echo showing mild dilation of the sinuses of Valsalva and mild dilation of the ascending aorta.  We discussed if he ever had a CT scan or chest x-ray and he reported that he had when he had gallbladder surgery in 2022.  We reviewed a chest x-ray from 2022 at [facility] that showed ectasia of the aorta.  Then we reviewed a CT of the abdomen and pelvis from 2022 that showed a 3 mm nodule in his right middle lobe.  We discussed his history of tobacco.  He has a 1 pack/day time 33 years smoking history.  He did quit in 2017 or 2018.    He does not remember having any follow-up CT of the chest for further evaluation of the aortic ectasia or the 3 mm lung nodule.    He denies any shortness of air.  He is able to perform exertional activities at work as he lifts, pushes and bends frequently with heavy loads.  He denies any unexplained weight loss.  He denies any persistent chronic cough or coughing up blood.    Sleep and Cardiac History :     1. THIAGO baseline AHI of 36 on PSG 17     -Current THIAGO therapy auto CPAP 8-12cm     2. ECHO 24 :  ·  Left ventricular systolic function is normal. Left ventricular ejection fraction appears to be 61 - 65%.  ·  Left ventricular wall thickness is consistent with borderline concentric hypertrophy.  ·   "Left ventricular diastolic function was normal.  ·  Mild dilation of the sinuses of Valsalva is present. Mild dilation of the ascending aorta is present.     3. Stress ECHO 2024- Low Risk     4.  A CT of abdomen and pelvis  showed a 3 mm right middle lobe lung nodule      Coexisting; Hypertension and hyperlipidemia      Reports Denies   Chest Pain [] [x]   Shortness of Air [] [x]   Palpitations [] [x]   Edema [] [x]   Dizziness [] [x]   Syncope [] [x]       Current mask used is nasal cushion    Device Functioning Well: Yes  Mask Fit Comfortable: Yes  Air Flow Comfortable: Yes  DME Helpful for Supplies: Yes  Sleep is rested:   Yes    Device Download:               No Known Allergies    Current Outpatient Medications:     cetirizine (zyrTEC) 10 MG tablet, Take 1 tablet by mouth Daily., Disp: , Rfl:     ezetimibe (ZETIA) 10 MG tablet, Take 1 tablet by mouth Daily., Disp: 90 tablet, Rfl: 1    lisinopril (PRINIVIL,ZESTRIL) 20 MG tablet, Take 1 tablet by mouth Daily., Disp: 90 tablet, Rfl: 1    pravastatin (Pravachol) 20 MG tablet, Take 1 tablet by mouth Daily., Disp: 90 tablet, Rfl: 1    Past Medical History:   Diagnosis Date    Gallstones     Hyperlipidemia     Hypertension     Obstructive sleep apnea (adult) (pediatric)     AHI 36      Past Surgical History:   Procedure Laterality Date    DENTAL PROCEDURE      ROOT CANAL    GALLBLADDER SURGERY  2022    VASECTOMY       History reviewed. No pertinent family history.  Social History     Socioeconomic History    Marital status:     Number of children: 2   Tobacco Use    Smoking status: Former     Current packs/day: 0.00     Types: Cigarettes     Quit date:      Years since quittin.8     Passive exposure: Past    Smokeless tobacco: Never   Vaping Use    Vaping status: Never Used   Substance and Sexual Activity    Alcohol use: Never    Drug use: Never       Objective     Vital Signs:  /84   Pulse 73   Ht 172.7 cm (68\")   Wt 107 kg (235 " "lb)   SpO2 95%   BMI 35.73 kg/m²   Estimated body mass index is 35.73 kg/m² as calculated from the following:    Height as of this encounter: 172.7 cm (68\").    Weight as of this encounter: 107 kg (235 lb).               Physical Exam  Constitutional:       Appearance: Normal appearance. He is well-developed.   HENT:      Head: Normocephalic and atraumatic.      Nose: Nose normal.      Mouth/Throat:      Mouth: Mucous membranes are moist.   Eyes:      General: No scleral icterus.     Pupils: Pupils are equal, round, and reactive to light.   Neck:      Vascular: No carotid bruit.   Cardiovascular:      Rate and Rhythm: Normal rate and regular rhythm.      Pulses: Normal pulses.           Radial pulses are 2+ on the right side and 2+ on the left side.        Dorsalis pedis pulses are 2+ on the right side and 2+ on the left side.        Posterior tibial pulses are 2+ on the right side and 2+ on the left side.      Heart sounds: Normal heart sounds. No murmur heard.  Pulmonary:      Effort: Pulmonary effort is normal.      Breath sounds: Normal breath sounds. No wheezing or rhonchi.   Abdominal:      General: Bowel sounds are normal.   Musculoskeletal:      Right lower leg: No edema.      Left lower leg: No edema.   Skin:     General: Skin is warm and dry.      Capillary Refill: Capillary refill takes less than 2 seconds.      Coloration: Skin is not cyanotic.      Nails: There is no clubbing.   Neurological:      Mental Status: He is alert and oriented to person, place, and time.      Motor: No weakness.      Gait: Gait normal.   Psychiatric:         Mood and Affect: Mood normal.         Behavior: Behavior normal. Behavior is cooperative.         Thought Content: Thought content normal.         Cognition and Memory: Memory normal.         The following data was reviewed by: MARIO Neff on 10/30/2024:    Chest x-ray and CT of the abdomen pelvis from December 2022.          Assessment and Plan "     Diagnoses and all orders for this visit:    1. Nodule of middle lobe of right lung (Primary)  Assessment & Plan:    3mm nodule in the right middle lobe seen on CT 2022. He has not had any follow up CT of the chest.     No cough, no unexplained weight loss.     He has tobacco history of  33 years with 1 ppd. He quiet 2017.     Plan:     CT of the chest with contrast for follow up.     He wishes for me to call his with results     He has scheduled follow up in about 5 months     Orders:  -     CT Chest With Contrast Diagnostic; Future    2. Dilated aortic root  Assessment & Plan:  Seen on echo:  -Dilated ascending aorta  -Dilated sinus of Valsalva    Plan:    CT of the chest for further evaluation to rule out aneurysm    Orders:  -     CT Chest With Contrast Diagnostic; Future    3. Primary hypertension  Assessment & Plan:  Blood pressure today 128/84.  This is well-controlled.    He is currently on lisinopril 20 mg daily.    Plan:    Continue lisinopril 20 mg daily    Encouraged continued weight loss as his weight is down nearly 50 pounds    Encouraged low-sodium diet      4. Pure hypercholesterolemia  Assessment & Plan:   He is currently taking:  -Pravastatin 20 mg daily  -Zetia 10 mg daily  -Over-the-counter co-Q10  -Over-the-counter Krill    He reports that at times he will have some mild cramping, aching in the muscles of his right lower extremity and right knee.    He reports he does suspect pain may be a side effect of the statin but at lower dose this is tolerable.    Plan:    Continue current medications          Recommendations: Report if any new/changing symptoms immediately          Follow Up  No follow-ups on file.  Patient was given instructions and counseling regarding his condition or for health maintenance advice. Please see specific information pulled into the AVS if appropriate.

## 2024-10-30 NOTE — ASSESSMENT & PLAN NOTE
Blood pressure today 128/84.  This is well-controlled.    He is currently on lisinopril 20 mg daily.    Plan:    Continue lisinopril 20 mg daily    Encouraged continued weight loss as his weight is down nearly 50 pounds    Encouraged low-sodium diet

## 2024-10-30 NOTE — ASSESSMENT & PLAN NOTE
He is currently taking:  -Pravastatin 20 mg daily  -Zetia 10 mg daily  -Over-the-counter co-Q10  -Over-the-counter Krill    He reports that at times he will have some mild cramping, aching in the muscles of his right lower extremity and right knee.    He reports he does suspect pain may be a side effect of the statin but at lower dose this is tolerable.    Plan:    Continue current medications

## 2024-10-31 ENCOUNTER — PATIENT ROUNDING (BHMG ONLY) (OUTPATIENT)
Dept: CARDIOLOGY | Facility: CLINIC | Age: 54
End: 2024-10-31
Payer: COMMERCIAL

## 2024-10-31 NOTE — PROGRESS NOTES
..My name is Bijal Hernandes and I am the Practice Manager for [facility].    I would like to thank you for being a loyal patient. If you do not mind I would like to ask you a few questions about your recent visit with us.  Please feel free to reply if you wish to provide us with feedback on your first visit with our practice.    First, could you tell me what went well with your recent visit?    Secondly, we are always looking for ways to make our patients' experiences even better.  Do you have any recommendations on ways we may improve?    Finally, overall were you satisfied with your first visit to us as a [facility] facility?    In the next few days, you will be receiving a Patient Experience Survey.      Thank you for taking the time to answer a few questions today.  I hope you have a good day.

## 2024-12-02 ENCOUNTER — TELEPHONE (OUTPATIENT)
Dept: CARDIOLOGY | Facility: CLINIC | Age: 54
End: 2024-12-02
Payer: COMMERCIAL

## 2024-12-08 DIAGNOSIS — E78.00 PURE HYPERCHOLESTEROLEMIA: ICD-10-CM

## 2024-12-09 RX ORDER — PRAVASTATIN SODIUM 20 MG
20 TABLET ORAL DAILY
Qty: 90 TABLET | Refills: 1 | Status: SHIPPED | OUTPATIENT
Start: 2024-12-09

## 2024-12-14 DIAGNOSIS — E78.00 PURE HYPERCHOLESTEROLEMIA: ICD-10-CM

## 2024-12-16 ENCOUNTER — TELEPHONE (OUTPATIENT)
Dept: CARDIOLOGY | Facility: CLINIC | Age: 54
End: 2024-12-16
Payer: COMMERCIAL

## 2024-12-16 DIAGNOSIS — I77.810 DILATED AORTIC ROOT: ICD-10-CM

## 2024-12-16 DIAGNOSIS — R91.1 NODULE OF MIDDLE LOBE OF RIGHT LUNG: ICD-10-CM

## 2024-12-16 RX ORDER — EZETIMIBE 10 MG/1
10 TABLET ORAL DAILY
Qty: 90 TABLET | Refills: 1 | Status: SHIPPED | OUTPATIENT
Start: 2024-12-16

## 2024-12-16 NOTE — TELEPHONE ENCOUNTER
----- Message from Gabriela Toledo sent at 12/16/2024 11:50 AM EST -----  Call patient with results of CT of the chest.  Tell him this looks good.  No suspicious lung nodules or lung masses.  He has a follow-up appointment I think with me in March.  Encourage him to keep that appointment.

## 2025-03-10 ENCOUNTER — OFFICE VISIT (OUTPATIENT)
Dept: CARDIOLOGY | Facility: CLINIC | Age: 55
End: 2025-03-10
Payer: COMMERCIAL

## 2025-03-10 VITALS
DIASTOLIC BLOOD PRESSURE: 80 MMHG | OXYGEN SATURATION: 98 % | WEIGHT: 221 LBS | BODY MASS INDEX: 33.49 KG/M2 | HEART RATE: 70 BPM | SYSTOLIC BLOOD PRESSURE: 120 MMHG | HEIGHT: 68 IN

## 2025-03-10 DIAGNOSIS — I10 PRIMARY HYPERTENSION: Primary | ICD-10-CM

## 2025-03-10 DIAGNOSIS — G47.33 OBSTRUCTIVE SLEEP APNEA (ADULT) (PEDIATRIC): ICD-10-CM

## 2025-03-10 DIAGNOSIS — E78.00 PURE HYPERCHOLESTEROLEMIA: ICD-10-CM

## 2025-03-10 DIAGNOSIS — Z12.5 SCREENING PSA (PROSTATE SPECIFIC ANTIGEN): ICD-10-CM

## 2025-03-10 PROBLEM — R91.1 NODULE OF MIDDLE LOBE OF RIGHT LUNG: Status: RESOLVED | Noted: 2024-10-30 | Resolved: 2025-03-10

## 2025-03-10 PROBLEM — I77.810 DILATED AORTIC ROOT: Status: RESOLVED | Noted: 2024-10-30 | Resolved: 2025-03-10

## 2025-03-10 PROCEDURE — 99214 OFFICE O/P EST MOD 30 MIN: CPT | Performed by: NURSE PRACTITIONER

## 2025-03-10 RX ORDER — PRAVASTATIN SODIUM 20 MG
20 TABLET ORAL DAILY
Qty: 90 TABLET | Refills: 1 | Status: SHIPPED | OUTPATIENT
Start: 2025-03-10

## 2025-03-10 RX ORDER — LISINOPRIL 20 MG/1
20 TABLET ORAL DAILY
Qty: 90 TABLET | Refills: 1 | Status: SHIPPED | OUTPATIENT
Start: 2025-03-10

## 2025-03-10 RX ORDER — EZETIMIBE 10 MG/1
10 TABLET ORAL DAILY
Qty: 90 TABLET | Refills: 1 | Status: SHIPPED | OUTPATIENT
Start: 2025-03-10

## 2025-03-10 NOTE — ASSESSMENT & PLAN NOTE
Known history of severe sleep apnea.  Baseline AHI is 36.    He is on CPAP therapy.    Download is reviewed with good control and good compliance.    He is benefiting from PAP therapy.    Plan to continue PAP therapy.    Prescription for PAP supplies to the DME of his choice

## 2025-03-10 NOTE — PROGRESS NOTES
[facility] Provider Note     Date:   03/10/2025  Name: Prakash Hinton  :   1970  PCP: Provider, No Known    Chief Complaint   Patient presents with    Sleep Apnea       Subjective     History of Present Illness  Prakash Hinton is a 54 y.o. male who presents today for scheduled 4-month follow-up for his known history of lung nodule, sleep apnea, hypertension and hyperlipidemia.    He has completed a repeat CT of the chest for further evaluation and follow-up of the lung nodule seen on his CT of the abdomen and pelvis in .  Discussed findings on CT that showed no acute intrathoracic abnormality.  No suspicious pulmonary nodules.  And a stable right adrenal nodule since .  This was thought to be an adenoma.  Also seen hypodense lesion in the left lobe of the liver that is favored to be a cyst.    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  He denies any chest pain or shortness of air.    He reports that he continues to eat a healthy diet and exercise by walking with his wife.  He reports that her weight is down about 50 pounds and his weight is down about 30 pounds this year.  They have been working on a healthier lifestyle over the last few years.    Sleep and Cardiac History and testin. THIAGO baseline AHI of 36 on PSG 17     -Current THIAGO therapy auto CPAP 8-12cm     2. ECHO 24 :  ·  Left ventricular systolic function is normal. Left ventricular ejection fraction appears to be 61 - 65%.  ·  Left ventricular wall thickness is consistent with borderline concentric hypertrophy.  ·  Left ventricular diastolic function was normal.  ·  Mild dilation of the sinuses of Valsalva is present. Mild dilation of the ascending aorta is present.     3. Stress ECHO 2024- Low Risk     4.  A CT of abdomen and pelvis  showed a 3 mm right middle lobe lung nodule  -CT of the chest 2024  "showing:  No acute intrathoracic abnormality   No suspicious pulmonary nodule, mass   Stable right adrenal nodule since .  Likely adenoma.  Liver left lobe cyst    Coexisting; Hypertension and hyperlipidemia      Reports Denies   Chest Pain [] [x]   Shortness of Air [] [x]   Palpitations [] [x]   Edema [] [x]   Dizziness [] [x]   Syncope [] [x]       Current mask used is Nasal cushion     Device Functioning Well: Yes  Mask Fit Comfortable: Yes  Air Flow Comfortable: Yes  DME Helpful for Supplies: Yes  Sleep is rested:   Yes    Device Download:               No Known Allergies    Current Outpatient Medications:     cetirizine (zyrTEC) 10 MG tablet, Take 1 tablet by mouth Daily., Disp: , Rfl:     ezetimibe (ZETIA) 10 MG tablet, TAKE 1 TABLET BY MOUTH DAILY, Disp: 90 tablet, Rfl: 1    lisinopril (PRINIVIL,ZESTRIL) 20 MG tablet, Take 1 tablet by mouth Daily., Disp: 90 tablet, Rfl: 1    pravastatin (PRAVACHOL) 20 MG tablet, TAKE 1 TABLET BY MOUTH DAILY, Disp: 90 tablet, Rfl: 1    Past Medical History:   Diagnosis Date    Gallstones     Hyperlipidemia     Hypertension     Obstructive sleep apnea (adult) (pediatric)     AHI 36      Past Surgical History:   Procedure Laterality Date    DENTAL PROCEDURE      ROOT CANAL    GALLBLADDER SURGERY  2022    VASECTOMY       History reviewed. No pertinent family history.  Social History     Socioeconomic History    Marital status:     Number of children: 2   Tobacco Use    Smoking status: Former     Current packs/day: 0.00     Types: Cigarettes     Quit date:      Years since quittin.1     Passive exposure: Past    Smokeless tobacco: Never   Vaping Use    Vaping status: Never Used   Substance and Sexual Activity    Alcohol use: Never    Drug use: Never       Objective     Vital Signs:  /80 (BP Location: Right arm, Patient Position: Sitting, Cuff Size: Adult)   Pulse 70   Ht 172.7 cm (68\")   Wt 100 kg (221 lb)   SpO2 98%   BMI 33.60 kg/m² " "  Estimated body mass index is 33.6 kg/m² as calculated from the following:    Height as of this encounter: 172.7 cm (68\").    Weight as of this encounter: 100 kg (221 lb).               Physical Exam  Constitutional:       Appearance: Normal appearance. He is well-developed.   HENT:      Head: Normocephalic and atraumatic.      Nose: Nose normal.      Mouth/Throat:      Mouth: Mucous membranes are moist.   Eyes:      General: No scleral icterus.     Pupils: Pupils are equal, round, and reactive to light.   Neck:      Vascular: No carotid bruit.   Cardiovascular:      Rate and Rhythm: Normal rate and regular rhythm.      Pulses: Normal pulses.           Radial pulses are 2+ on the right side and 2+ on the left side.        Dorsalis pedis pulses are 2+ on the right side and 2+ on the left side.        Posterior tibial pulses are 2+ on the right side and 2+ on the left side.      Heart sounds: Normal heart sounds. No murmur heard.  Pulmonary:      Effort: Pulmonary effort is normal.      Breath sounds: Normal breath sounds. No wheezing or rhonchi.   Abdominal:      General: Bowel sounds are normal.   Musculoskeletal:      Right lower leg: No edema.      Left lower leg: No edema.   Skin:     General: Skin is warm and dry.      Capillary Refill: Capillary refill takes less than 2 seconds.      Coloration: Skin is not cyanotic.      Nails: There is no clubbing.   Neurological:      Mental Status: He is alert and oriented to person, place, and time.      Motor: No weakness.      Gait: Gait normal.   Psychiatric:         Mood and Affect: Mood normal.         Behavior: Behavior normal. Behavior is cooperative.         Thought Content: Thought content normal.         Cognition and Memory: Memory normal.         The following data was reviewed by: MARIO Neff on 03/10/2025:    PAP download reviewed: 30-day download as above.  I have reviewed and interpreted the data on the download at today's visit.  And " radiology study 12/13/2024 CT of the chest.          Assessment and Plan     Diagnoses and all orders for this visit:    1. Primary hypertension (Primary)  Assessment & Plan:  Blood pressure today is well-controlled.  Blood pressure 120/80.    He is currently on lisinopril 20 mg daily.    Plan:    Continue lisinopril 20 mg daily    Encouraged to continue weight loss as he is down 30 pounds this last year    Encouraged low-sodium diet    Orders:  -     Comprehensive Metabolic Panel; Future  -     CBC & Differential; Future    2. Obstructive sleep apnea (adult) (pediatric)  Assessment & Plan:  Known history of severe sleep apnea.  Baseline AHI is 36.    He is on CPAP therapy.    Download is reviewed with good control and good compliance.    He is benefiting from PAP therapy.    Plan to continue PAP therapy.    Prescription for PAP supplies to the DME of his choice    Orders:  -     PAP Therapy    3. Pure hypercholesterolemia  Assessment & Plan:   He has a known history of hyperlipidemia.    He is on pravastatin 20 mg and Zetia 10 mg daily.    In addition he also takes over-the-counter co-Q10 and krill oil.    He reports that he does have right knee pain and some very mild cramping and aching in the muscles of his right lower extremity.  But he suspects he has a right hip or knee problem and may need to see orthopedics.    Plan:    Check fasting lipids    Continue current medication regimen        Orders:  -     Lipid Panel; Future  -     Comprehensive Metabolic Panel; Future    4. Screening PSA (prostate specific antigen)  -     PSA Screen; Future        Recommendations: Report if any new/changing symptoms immediately          Follow Up  Return in about 6 months (around 9/10/2025) for THIAGO/HTN/HLD/EKG.  Patient was given instructions and counseling regarding his condition or for health maintenance advice. Please see specific information pulled into the AVS if appropriate.

## 2025-03-10 NOTE — ASSESSMENT & PLAN NOTE
Blood pressure today is well-controlled.  Blood pressure 120/80.    He is currently on lisinopril 20 mg daily.    Plan:    Continue lisinopril 20 mg daily    Encouraged to continue weight loss as he is down 30 pounds this last year    Encouraged low-sodium diet

## 2025-03-10 NOTE — ASSESSMENT & PLAN NOTE
He has a known history of hyperlipidemia.    He is on pravastatin 20 mg and Zetia 10 mg daily.    In addition he also takes over-the-counter co-Q10 and krill oil.    He reports that he does have right knee pain and some very mild cramping and aching in the muscles of his right lower extremity.  But he suspects he has a right hip or knee problem and may need to see orthopedics.    Plan:    Check fasting lipids    Continue current medication regimen

## 2025-03-11 ENCOUNTER — PATIENT ROUNDING (BHMG ONLY) (OUTPATIENT)
Dept: CARDIOLOGY | Facility: CLINIC | Age: 55
End: 2025-03-11
Payer: COMMERCIAL

## 2025-03-11 NOTE — PROGRESS NOTES
..My name is Bijal Hernandes and I am the Practice Manager for [facility].    I would like to thank you for being a loyal patient. If you do not mind I would like to ask you a few questions about your recent visit with us.  Please feel free to reply if you wish to provide us with feedback on your first visit with our practice.    First, could you tell me what went well with your recent visit?    Secondly, we are always looking for ways to make our patients' experiences even better.  Do you have any recommendations on ways we may improve?    Finally, overall were you satisfied with your first visit to us as a [facility] facility?    In the next few days, you will be receiving a Patient Experience Survey.      Thank you for taking the time to answer a few questions today.  I hope you have a good day.